# Patient Record
Sex: FEMALE | Race: WHITE
[De-identification: names, ages, dates, MRNs, and addresses within clinical notes are randomized per-mention and may not be internally consistent; named-entity substitution may affect disease eponyms.]

---

## 2018-09-20 ENCOUNTER — HOSPITAL ENCOUNTER (EMERGENCY)
Dept: HOSPITAL 41 - JD.ED | Age: 23
Discharge: HOME | End: 2018-09-20
Payer: COMMERCIAL
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
## 2018-09-20 DIAGNOSIS — F32.9: ICD-10-CM

## 2018-09-20 DIAGNOSIS — F41.9: ICD-10-CM
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
## 2018-09-20 DIAGNOSIS — S61.300A: Primary | ICD-10-CM

## 2018-09-20 PROCEDURE — 99283 EMERGENCY DEPT VISIT LOW MDM: CPT

## 2018-09-20 NOTE — EDM.PDOC
ED HPI GENERAL MEDICAL PROBLEM





- General


Chief Complaint: Laceration


Stated Complaint: RT POINTER FINGER LAC


Time Seen by Provider: 09/20/18 10:42


Source of Information: Reports: Patient


History Limitations: Reports: No Limitations





- History of Present Illness


INITIAL COMMENTS - FREE TEXT/NARRATIVE: 


23-year-old female presents to the ED with a work-related injury. She states 

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 

right index finger came in contact with the blade. Suffered an large avulsion 

injury to the distal aspect of her right index finger. Of note she is right 

hand dominant. Tetanus toxoid is up-to-date. No other injuries occurred.





Onset: Today


Onset Date: 09/20/18


Onset Time: 09:30


Duration: Minutes:


Location: Reports: Upper Extremity, Right (Right index finger)


Quality: Reports: Ache, Sharp, Stabbing, Throbbing


Severity: Moderate


Improves with: Reports: None (Another 10)


Worsens with: Reports: None


Context: Reports: Trauma (Work related injury when the radial aspect of her 

right index finger came in contact with a blade of a meat saw.).  Denies: 

Activity, Exercise, Lifting, Sick Contact


Associated Symptoms: Reports: No Other Symptoms


Treatments PTA: Reports: Other (see below)


  ** Right 2-Index finger


Pain Score (Numeric/FACES): 4





- Related Data


 Allergies











Allergy/AdvReac Type Severity Reaction Status Date / Time


 


No Known Allergies Allergy   Verified 09/20/18 09:53











Home Meds: 


 Home Meds





. [No Known Home Meds]  09/20/18 [History]











Past Medical History





- Past Health History


Medical/Surgical History: Denies Medical/Surgical History


OB/GYN History: Reports: Polycystic Ovaries


Psychiatric History: Reports: Anxiety, Depression





- Past Surgical History


Other Female  Surgeries/Procedures: One sided falopian tube removal





Social & Family History





- Family History


Family Medical History: Noncontributory





- Living Situation & Occupation


Living situation: Reports: Single


Occupation: Employed





ED ROS GENERAL





- Review of Systems


Review Of Systems: See Below


Constitutional: Reports: No Symptoms


HEENT: Reports: No Symptoms


Respiratory: Reports: No Symptoms


Cardiovascular: Reports: No Symptoms


Endocrine: Reports: No Symptoms


GI/Abdominal: Reports: No Symptoms


: Reports: No Symptoms


Musculoskeletal: Reports: No Symptoms


Skin: Reports: Other (Avulsion injury to the radial aspect of her right index 

finger middle phalanx)


Neurological: Reports: No Symptoms


Psychiatric: Reports: Anxiety (Currently under control with medical conditions.)

, Depression


Hematologic/Lymphatic: Reports: No Symptoms


Immunologic: Reports: No Symptoms





ED EXAM, SKIN/RASH


Exam: See Below


Exam Limited By: No Limitations


General Appearance: Alert, WD/WN, Moderate Distress


Eye Exam: Bilateral Eye: Normal Inspection


Extremities: Other (Examination was primarily to the right index finger. 

Patient is suffered an avulsion injury to the radial aspect of the middle 

phalanx of her index finger. This occurred when he came in contact with a meat 

slicer in the workplace this morning. Is a deep full thickness skin loss 

approximate 1.2 cm in length and 0.8 cm in width. Early nothing to suture. The 

fingernails intact. She still has sensation to the distal radial aspect of her 

finger. No neurovascular involvement appeared to occur and no tendon 

involvement.)


Neurological: Alert


Skin: Warm, Dry, Intact, Normal Color





Course





- Vital Signs


Last Recorded V/S: 


 Last Vital Signs











Temp  36.8 C   09/20/18 09:51


 


Pulse  101 H  09/20/18 09:51


 


Resp  17   09/20/18 09:51


 


BP  138/87   09/20/18 09:51


 


Pulse Ox  100   09/20/18 09:51














- Orders/Labs/Meds


Meds: 


Medications














Discontinued Medications














Generic Name Dose Route Start Last Admin





  Trade Name Jerrod  PRN Reason Stop Dose Admin


 


Ibuprofen  600 mg  09/20/18 11:30  





  Motrin  PO  09/20/18 11:31  





  ONETIME ONE   





     





     





     





     


 


Oxycodone/Acetaminophen  1 tab  09/20/18 11:29  





  Percocet 325-5 Mg  PO  09/20/18 11:30  





  ONETIME ONE   





     





     





     





     














- Radiology Interpretation


Free Text/Narrative:: 


23-year-old female presents to the ED with a work-related injury from this 

morning. She right index finger came in contact with a  in the 

workplace at about 9:30 this morning. This resulted in a avulsion injury to the 

middle phalanx of the radial aspect of her index finger. She has a loss of skin 

full-thickness approximate 1.2 cm in length and 0.8 cm in width. Treatment will 

have to be conservative and there is really nothing to suture. Neuro no 

neurovascular involvement has occurred. Wound has been soaked. Tetanus toxoid 

is up-to-date. Plan topical antibiotic Telfa pad and then tube gauze dressing 

for the next 3 days. After that she is to remove the dressing and cleanse the 

area daily with soap and water or with light soaks in some Epsom salts. Will 

antibiotic to the wound and apply a large bandage to keep clean. Note given to 

excuse her from the work place for the next 7-10 days until she will regain 

some of her  strength. She may return to work sooner if alternative work 

duties are available to her she will not have to use her right hand at all. 

Given Motrin 600 mg by mouth with Percocet 5/325 mg by mouth once.








Departure





- Departure


Time of Disposition: 11:08


Disposition: Home, Self-Care 01


Condition: Fair


Clinical Impression: 


Avulsion of skin of index finger


Qualifiers:


 Encounter type: initial encounter Qualified Code(s): S61.208A - Unspecified 

open wound of other finger without damage to nail, initial encounter








- Discharge Information


*PRESCRIPTION DRUG MONITORING PROGRAM REVIEWED*: Not Applicable


*COPY OF PRESCRIPTION DRUG MONITORING REPORT IN PATIENT ISREAL: Not Applicable


Instructions:  Deep Skin Avulsion


Referrals: 


PCP,None [Primary Care Provider] - 


Forms:  ED Department Discharge, ED Return to Work/School Form


Additional Instructions: 


Evaluation in the ED today in regards to work related injury. Deep avulsion 

injury has occurred along the medial-radial aspect of your right index finger. 

Is occurred when the finger came in contact with a  blade. The 

tissue has been completely avulsed and is full thickness skin loss 

approximately 1.2 cm x 8 mm. Treatment is conservative. Dressing placed in the 

ED is to remain in place for the next 3 days. After that she may remove the 

dressing and either soak the finger in warm solution with a little bit of Epson 

salts or shower with glancing water not hurting it. Then apply topical 

antibiotic such as bacitracin or Polysporin to the wound daily and cover with a 

bandage to keep clean. Wound will take between 4 and 5 weeks to completely 

heal. No skin will come from the  the edges of the wound and slowly 

closing in. Will be a permanent defect in the finger but mild. You may return 

to work as long as you're able to your to your work without use of the right 

index finger such as grabbing and  strength for a minimum of 10 days.Motrin 

600mg every 6hrs as needed for pain relief.  After that you may use as 

tolerated. Return to medical care if any signs of infection occur such as 

redness swelling or obvious pus.

## 2019-01-21 ENCOUNTER — HOSPITAL ENCOUNTER (EMERGENCY)
Dept: HOSPITAL 56 - MW.ED | Age: 24
Discharge: HOME | End: 2019-01-21
Payer: COMMERCIAL

## 2019-01-21 DIAGNOSIS — Z23: ICD-10-CM
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
## 2019-01-21 DIAGNOSIS — S61.210A: Primary | ICD-10-CM

## 2019-01-21 DIAGNOSIS — F17.210: ICD-10-CM

## 2019-01-21 NOTE — EDM.PDOC
ED HPI GENERAL MEDICAL PROBLEM





- General


Chief Complaint: Laceration


Stated Complaint: LACERATION ON FINGER


Time Seen by Provider: 01/21/19 16:33


Source of Information: Reports: Patient


History Limitations: Reports: No Limitations





- History of Present Illness


INITIAL COMMENTS - FREE TEXT/NARRATIVE: 


HISTORY AND PHYSICAL:





History of present illness:


Patient is a 24-year-old female who presents to the emergency room with 

complaints of a laceration to her right index finger. She states she was using 

a bread knife when it slipped resulting in this laceration. Her tetanus has 

been updated within the last 5 years.





Review of systems: 


As per history of present illness and below otherwise all systems reviewed and 

negative.





Past medical history: 


As per history of present illness and as reviewed below otherwise 

noncontributory.





Surgical history: 


As per history of present illness and as reviewed below otherwise 

noncontributory.





Social history: 


See social history for further information





Family history: 


As per history of present illness and as reviewed below otherwise 

noncontributory.





Physical exam:


General: Well-developed and well-nourished 24-year-old female. Alert and 

oriented. Nontoxic appearing and in no acute distress.


HEENT: Atraumatic, normocephalic, pupils equal and reactive bilaterally, 

negative for conjunctival pallor or scleral icterus, mucous membranes moist, 

TMs normal bilaterally, throat clear, neck supple, nontender, trachea midline. 

No drooling or trismus noted. No meningeal signs. No hot potato voice noted. 


Lungs: Clear to auscultation, breath sounds equal bilaterally, chest nontender.


Heart: S1S2, regular rate and rhythm without overt murmur


Abdomen: Soft, nondistended, nontender. 


Pelvis: Stable nontender.


Genitourinary: Deferred.


Rectal: Deferred.


Skin: 1 cm laceration to the palmar surface of the right distal index finger. 

This does not involve the nailbed. Otherwise skin is intact, warm, dry. No 

lesions or rashes noted.


Extremities: See skin for details. There is no tendon injury association with 

this laceration. negative for cords or calf pain. Neurovascular unremarkable.


Neuro: Awake, alert, oriented. Cranial nerves II through XII unremarkable. 

Cerebellum unremarkable. Motor and sensory unremarkable throughout. Exam 

nonfocal.





Notes:


Area was cleansed with chlorhexidine. 1% lidocaine was used to anesthetize the 

area. 5-0 chromic, #3 interrupted sutures placed using sterile technique. 

Patient tolerated well. Nonstick dressing applied. Supportive care measures 

were reviewed and discussed. She voices understanding and is agreeable to plan 

of care. Denies any further questions or concerns at this time.





Diagnostics:


None





Therapeutics:


Lidocaine, wound care, nonstick bulky dressing





Prescription:


None





Impression: 


Laceration





Plan:


1. Keep the area clean and dry. Continue to monitor for signs of infection. 

Sutures to be removed in 7-10 days.


2. Tylenol and/or ibuprofen as needed for pain management.


3. Please follow-up with your primary care provider in the next 1-2 days. 

Return to the ED as needed and as discussed.





Definitive disposition and diagnosis as appropriate pending reevaluation and 

review of above.








- Related Data


 Allergies











Allergy/AdvReac Type Severity Reaction Status Date / Time


 


No Known Allergies Allergy   Verified 01/21/19 16:43











Home Meds: 


 Home Meds





. [No Known Home Meds]  09/20/18 [History]











Past Medical History





- Past Health History


Medical/Surgical History: Denies Medical/Surgical History


OB/GYN History: Reports: Polycystic Ovaries


Psychiatric History: Reports: Anxiety, Depression





- Infectious Disease History


Infectious Disease History: Reports: None





- Past Surgical History


Other Female  Surgeries/Procedures: One sided falopian tube removal





Social & Family History





- Family History


Family Medical History: Noncontributory





- Tobacco Use


Smoking Status *Q: Current Some Day Smoker


Years of Tobacco use: 1


Packs/Tins Daily: 0.1





- Recreational Drug Use


Recreational Drug Use: No





- Living Situation & Occupation


Living situation: Reports: Single


Occupation: Employed





ED ROS GENERAL





- Review of Systems


Review Of Systems: ROS reveals no pertinent complaints other than HPI.





ED EXAM, SKIN/RASH


Exam: See Below (See dictation)





ED SKIN PROCEDURES





- Laceration/Wound Repair


  ** right index finger


Lac/Wound length In cm: 1


Appearance: Subcutaneous, Linear, Clean


Distal NVT: Neuro & Vascular Intact, No Tendon Injury


Anesthetic Type: Local


Local Anesthesia - Lidocaine (Xylocaine): 1% Plain


Local Anesthetic Volume: 1cc


Skin Prep: Chlorhexidine (Hibiciens), Saline


Saline Irrigation (cc's): 25


Exploration/Debridement/Repair: Wound Explored, No Foreign Material Found


Closed with: Sutures


Suture Size: other (5-0)


Suture Type: Interrupted, Simple


Repaired with: Chromic


Sterile Dressing Applied: Provider


Tetanus Status Addressed: Yes


Complications: No





Course





- Vital Signs


Last Recorded V/S: 


 Last Vital Signs











Temp  96.9 F   01/21/19 16:44


 


Pulse  75   01/21/19 17:14


 


Resp  18   01/21/19 17:14


 


BP  122/59 L  01/21/19 16:44


 


Pulse Ox  98   01/21/19 17:14














- Orders/Labs/Meds


Orders: 


 Active Orders 24 hr











 Category Date Time Status


 


 Vaccines to be Administered [RC] PER UNIT ROUTINE Care  01/21/19 16:36 Active











Meds: 


Medications














Discontinued Medications














Generic Name Dose Route Start Last Admin





  Trade Name Freq  PRN Reason Stop Dose Admin


 


Diphtheria/Tetanus/Acell Pertussis  0.5 ml  01/21/19 16:36  01/21/19 16:50





  Adacel  IM  01/21/19 16:37  Not Given





  .ONCE ONE   





     





     





     





     


 


Lidocaine HCl  5 ml  01/21/19 16:36  01/21/19 16:50





  Xylocaine-Mpf 1%  INJECT  01/21/19 16:37  5 ml





  ONETIME ONE   Administration





     





     





     





     


 


Lidocaine HCl  5 ml  01/21/19 16:49  01/21/19 16:50





  Xylocaine-Mpf 1%  INJECT  01/21/19 16:50  Not Given





  ONETIME ONE   





     





     





     





     














Departure





- Departure


Time of Disposition: 17:01


Disposition: Home, Self-Care 01


Clinical Impression: 


 Laceration








- Discharge Information


Instructions:  Laceration Care, Adult, Easy-to-Read


Referrals: 


PCP,None [Primary Care Provider] - 


Forms:  ED Department Discharge


Additional Instructions: 


The following information is given to patients seen in the emergency department 

who are being discharged to home. This information is to outline your options 

for follow-up care. We provide all patients seen in our emergency department 

with a follow-up referral.





The need for follow-up, as well as the timing and circumstances, are variable 

depending upon the specifics of your emergency department visit.





If you don't have a primary care physician on staff, we will provide you with a 

referral. We always advise you to contact your personal physician following an 

emergency department visit to inform them of the circumstance of the visit and 

for follow-up with them and/or the need for any referrals to a consulting 

specialist.





The emergency department will also refer you to a specialist when appropriate. 

This referral assures that you have the opportunity for follow-up care with a 

specialist. All of these measure are taken in an effort to provide you with 

optimal care, which includes your follow-up.





Under all circumstances we always encourage you to contact your private 

physician who remains a resource for coordinating your care. When calling for 

follow-up care, please make the office aware that this follow-up is from your 

recent emergency room visit. If for any reason you are refused follow-up, 

please contact the [facility] Emergency 

Department at [phone] and asked to speak to the emergency department 

charge nurse.





[facility]


Primary Care


[address] [place] [id number]


Phone: [phone]


Fax: [phone]





[facility]


[address] [place] [address]


Phone: [phone]


Fax: [phone]





1. Keep the area clean and dry. Continue to monitor for signs of infection. 

Sutures to be removed in 7-10 days.


2. Tylenol and/or ibuprofen as needed for pain management.


3. Please follow-up with your primary care provider in the next 1-2 days. 

Return to the ED as needed and as discussed.





- My Orders


Last 24 Hours: 


My Active Orders





01/21/19 16:36


Vaccines to be Administered [RC] PER UNIT ROUTINE 














- Assessment/Plan


Last 24 Hours: 


My Active Orders





01/21/19 16:36


Vaccines to be Administered [RC] PER UNIT ROUTINE

## 2019-01-30 ENCOUNTER — HOSPITAL ENCOUNTER (EMERGENCY)
Dept: HOSPITAL 56 - MW.ED | Age: 24
Discharge: LEFT BEFORE DISCHARGE | End: 2019-01-30
Payer: COMMERCIAL

## 2019-01-30 DIAGNOSIS — Z53.21: Primary | ICD-10-CM

## 2019-04-01 ENCOUNTER — HOSPITAL ENCOUNTER (EMERGENCY)
Dept: HOSPITAL 56 - MW.ED | Age: 24
Discharge: HOME | End: 2019-04-01
Payer: SELF-PAY

## 2019-04-01 DIAGNOSIS — N39.0: Primary | ICD-10-CM

## 2019-04-01 LAB
CHLORIDE SERPL-SCNC: 104 MMOL/L (ref 98–107)
SODIUM SERPL-SCNC: 141 MMOL/L (ref 136–145)

## 2019-04-01 PROCEDURE — 36415 COLL VENOUS BLD VENIPUNCTURE: CPT

## 2019-04-01 PROCEDURE — 85025 COMPLETE CBC W/AUTO DIFF WBC: CPT

## 2019-04-01 PROCEDURE — 87086 URINE CULTURE/COLONY COUNT: CPT

## 2019-04-01 PROCEDURE — 96374 THER/PROPH/DIAG INJ IV PUSH: CPT

## 2019-04-01 PROCEDURE — 81001 URINALYSIS AUTO W/SCOPE: CPT

## 2019-04-01 PROCEDURE — 87088 URINE BACTERIA CULTURE: CPT

## 2019-04-01 PROCEDURE — 81025 URINE PREGNANCY TEST: CPT

## 2019-04-01 PROCEDURE — 76705 ECHO EXAM OF ABDOMEN: CPT

## 2019-04-01 PROCEDURE — 99284 EMERGENCY DEPT VISIT MOD MDM: CPT

## 2019-04-01 PROCEDURE — 96375 TX/PRO/DX INJ NEW DRUG ADDON: CPT

## 2019-04-01 PROCEDURE — 96361 HYDRATE IV INFUSION ADD-ON: CPT

## 2019-04-01 PROCEDURE — 87186 SC STD MICRODIL/AGAR DIL: CPT

## 2019-04-01 PROCEDURE — 80053 COMPREHEN METABOLIC PANEL: CPT

## 2019-04-01 PROCEDURE — 76830 TRANSVAGINAL US NON-OB: CPT

## 2019-04-01 NOTE — US
EXAMINATION: Transabdominal and transvaginal pelvic ultrasound

 

HISTORY: Pain

 

COMPARISON: None

 

TECHNIQUE: Grayscale, color Doppler, and spectral Doppler imaging obtained.

 

FINDINGS: The uterus is normal in size, contour, echogenicity without a focal

mass. Endometrial stripe thickness is normal at 7 mm. No significant free pelvic

fluid.

 

Both the left and right ovaries are normal in size, contour, and echogenicity

demonstrating normal color and spectral Doppler flow. Small follicles

bilaterally. No adnexal masses.

 

IMPRESSION: 

1. Unremarkable pelvic ultrasound.

## 2019-04-01 NOTE — US
EXAMINATION: Limited transabdominal ultrasound

 

HISTORY: Pain

 

COMPARISON: None

 

TECHNIQUE: Grayscale and color Doppler imaging obtained of the right aspect of

the abdomen.

 

FINDINGS: The appendix has not characterize. No free fluid noted within the

right lower quadrant.

 

Visualized pancreas and liver appear normal. The hepatic echotexture is normal.

The right kidney measures at least 11.7 cm [id number] without evidence

hydronephrosis. The gallbladder wall thickness is normal. No pericholecystic

fluid or shadowing gallstones. The common bile duct measures 3 mm. Sonographic

Wolfe sign is negative.

 

IMPRESSION: 

1. No acute findings noted within the right aspect of the abdomen.

## 2019-04-01 NOTE — EDM.PDOC
ED HPI GENERAL MEDICAL PROBLEM





- General


Chief Complaint: Abdominal Pain


Stated Complaint: stomach pain


Time Seen by Provider: 04/01/19 10:22


Source of Information: Reports: Patient


History Limitations: Reports: No Limitations





- History of Present Illness


INITIAL COMMENTS - FREE TEXT/NARRATIVE: 


HISTORY AND PHYSICAL:





History of present illness:


Patient is a 24-year-old female who presents to the emergency room with 

complaints of right lower quadrant pain over the past 9-10 days. She states she 

has had some nausea over the past 24 hours, without vomiting. She does have a 

history of PCOS and has had fallopian tube removed (unsure of which side). She 

has had moderate amount of vaginal discharge noted. 





Patient denies any fever, chills, headache, change in vision, syncope or near 

syncope. Denies any chest pain, shortness of breath or cough. Denies any 

vomiting, diarrhea, constipation or dysuria. Has not noted any blood in urine 

or stool. Patient has been eating and drinking appropriately. Last menstrual 

period was approximately 2-3 weeks ago, states of systems normal. Denies any 

chance of pregnancy.





Review of systems: 


As per history of present illness and below otherwise all systems reviewed and 

negative.





Past medical history: 


As per history of present illness and as reviewed below otherwise 

noncontributory.





Surgical history: 


As per history of present illness and as reviewed below otherwise 

noncontributory.





Social history: 


See social history for further information





Family history: 


As per history of present illness and as reviewed below otherwise 

noncontributory.





Physical exam:


General: Well-developed and well-nourished 24-year-old female. Alert and 

oriented. Nontoxic appearing and in no acute distress.


HEENT: Atraumatic, normocephalic, pupils equal and reactive bilaterally, 

negative for conjunctival pallor or scleral icterus, mucous membranes moist, 

TMs normal bilaterally, throat clear, neck supple, nontender, trachea midline. 

No drooling or trismus noted. No meningeal signs. No hot potato voice noted. 


Lungs: Clear to auscultation, breath sounds equal bilaterally, chest nontender.


Heart: S1S2, regular rate and rhythm without overt murmur


Abdomen: Soft, nondistended, RLQ tenderness (no rebound tenderness). Negative 

for masses or hepatosplenomegaly. Negative for costovertebral tenderness.


Pelvis: Stable nontender.


Genitourinary: Declined


Rectal: Deferred.


Skin: Intact, warm, dry. No lesions or rashes noted.


Extremities: Atraumatic, moves all per self, negative for cords or calf pain. 

Neurovascular unremarkable.


Neuro: Awake, alert, oriented. Cranial nerves II through XII unremarkable. 

Cerebellum unremarkable. Motor and sensory unremarkable throughout. Exam 

nonfocal.





Notes:


Patient declined pelvic exam and STD screening/[place]. Lab work does show a 

UTI. Ultrasound shows no acute findings. Appendix is normal. Gallbladder is 

normal without any gallstones. Transvaginal pelvic ultrasound is within normal 

limits. We'll treat the UTI with Pyridium and Macrobid. Supportive care 

measures were reviewed and discussed. Voices understanding and is agreeable to 

plan of care. Denies any further questions or concerns at this time.





Diagnostics:


CBC, CMP, UA, HCGU, Abd/Pelvic Ultrasound





Therapeutics:


IV fluids, Zofran, Toradol





Prescription:


Macrobid, Pyridium





Impression: 


UTI


Right sided abdominal pain





Plan:


1. Increase your oral fluids. Take your medications as directed.


2. Tylenol and/or ibuprofen as needed for pain management.


3. Follow-up with your primary care provider as we discussed. Return to the ED 

as needed and as discussed.





Definitive disposition and diagnosis as appropriate pending reevaluation and 

review of above.





  ** RLQ


Pain Score (Numeric/FACES): 7





- Related Data


 Allergies











Allergy/AdvReac Type Severity Reaction Status Date / Time


 


No Known Allergies Allergy   Verified 04/01/19 10:40











Home Meds: 


 Home Meds





. [No Known Home Meds]  09/20/18 [History]











Past Medical History





- Past Health History


Medical/Surgical History: Denies Medical/Surgical History


OB/GYN History: Reports: Polycystic Ovaries


Psychiatric History: Reports: Anxiety, Depression





- Infectious Disease History


Infectious Disease History: Reports: None





- Past Surgical History


Other Female  Surgeries/Procedures: One sided falopian tube removal





Social & Family History





- Family History


Family Medical History: Noncontributory





- Living Situation & Occupation


Living situation: Reports: Single


Occupation: Employed





ED ROS GENERAL





- Review of Systems


Review Of Systems: ROS reveals no pertinent complaints other than HPI.





ED EXAM, GI/ABD





- Physical Exam


Exam: See Below (See dictation)





Course





- Vital Signs


Last Recorded V/S: 


 Last Vital Signs











Temp  97.9 F   04/01/19 11:18


 


Pulse  86   04/01/19 11:18


 


Resp  16   04/01/19 11:18


 


BP  123/74   04/01/19 11:18


 


Pulse Ox  98   04/01/19 11:18














- Orders/Labs/Meds


Orders: 


 Active Orders 24 hr











 Category Date Time Status


 


 CHLAMYDIA AND GONORRHEA BY TMA Stat Lab  04/01/19 11:11 Stop Req


 


 CULTURE URINE [RM] Stat Lab  04/01/19 11:11 Received


 


 TRICH/BASSEM/CAND BY DNA PROBE [[facility]C] Stat Lab  04/01/19 11:11 Stop Req











Labs: 


 Laboratory Tests











  04/01/19 04/01/19 04/01/19 Range/Units





  11:11 11:11 11:11 


 


WBC  8.56    (4.0-11.0)  K/uL


 


RBC  4.69    (4.30-5.90)  M/uL


 


Hgb  14.8    (12.0-16.0)  g/dL


 


Hct  41.3    (36.0-46.0)  %


 


MCV  88.1    (80.0-98.0)  fL


 


MCH  31.6    (27.0-32.0)  pg


 


MCHC  35.8    (31.0-37.0)  g/dL


 


RDW Std Deviation  37.8    (28.0-62.0)  fl


 


RDW Coeff of Christos  12    (11.0-15.0)  %


 


Plt Count  280    (150-400)  K/uL


 


MPV  9.20    (7.40-12.00)  fL


 


Neut % (Auto)  74.7    (48.0-80.0)  %


 


Lymph % (Auto)  19.5    (16.0-40.0)  %


 


Mono % (Auto)  4.8    (0.0-15.0)  %


 


Eos % (Auto)  0.9    (0.0-7.0)  %


 


Baso % (Auto)  0.1    (0.0-1.5)  %


 


Neut # (Auto)  6.4 H    (1.4-5.7)  K/uL


 


Lymph # (Auto)  1.7    (0.6-2.4)  K/uL


 


Mono # (Auto)  0.4    (0.0-0.8)  K/uL


 


Eos # (Auto)  0.1    (0.0-0.7)  K/uL


 


Baso # (Auto)  0.0    (0.0-0.1)  K/uL


 


Nucleated RBC %  0.0    /100WBC


 


Nucleated RBCs #  0    K/uL


 


Sodium    141  (136-145)  mmol/L


 


Potassium    3.2 L  (3.5-5.1)  mmol/L


 


Chloride    104  ()  mmol/L


 


Carbon Dioxide    20.9 L  (21.0-32.0)  mmol/L


 


BUN    14  (7.0-18.0)  mg/dL


 


Creatinine    0.6  (0.6-1.0)  mg/dL


 


Est Cr Clr Drug Dosing    121.66  mL/min


 


Estimated GFR (MDRD)    > 60.0  ml/min


 


Glucose    91  ()  mg/dL


 


Calcium    9.7  (8.5-10.1)  mg/dL


 


Total Bilirubin    0.5  (0.2-1.0)  mg/dL


 


AST    18  (15-37)  IU/L


 


ALT    24  (14-63)  IU/L


 


Alkaline Phosphatase    61  ()  U/L


 


Total Protein    8.9 H  (6.4-8.2)  g/dL


 


Albumin    4.6  (3.4-5.0)  g/dL


 


Globulin    4.3 H  (2.6-4.0)  g/dL


 


Albumin/Globulin Ratio    1.1  (0.9-1.6)  


 


Urine Color   YELLOW   


 


Urine Appearance   SLT CLOUDY   


 


Urine pH   5.5   (5.0-8.0)  


 


Ur Specific Gravity   1.015   (1.001-1.035)  


 


Urine Protein   30 H   (NEGATIVE)  mg/dL


 


Urine Glucose (UA)   NEGATIVE   (NEGATIVE)  mg/dL


 


Urine Ketones   TRACE H   (NEGATIVE)  mg/dL


 


Urine Occult Blood   MODERATE H   (NEGATIVE)  


 


Urine Nitrite   POSITIVE H   (NEGATIVE)  


 


Urine Bilirubin   NEGATIVE   (NEGATIVE)  


 


Urine Urobilinogen   0.2   (<2.0)  EU/dL


 


Ur Leukocyte Esterase   SMALL H   (NEGATIVE)  


 


Urine RBC   8-10   (0-2/HPF)  


 


Urine WBC   5-8   (0-5/HPF)  


 


Ur Epithelial Cells   OCCASIONAL   (NONE-FEW)  


 


Urine Bacteria   1+ H   (NEGATIVE)  


 


Urine HCG, Qual     (NEGATIVE)  














  04/01/19 Range/Units





  11:13 


 


WBC   (4.0-11.0)  K/uL


 


RBC   (4.30-5.90)  M/uL


 


Hgb   (12.0-16.0)  g/dL


 


Hct   (36.0-46.0)  %


 


MCV   (80.0-98.0)  fL


 


MCH   (27.0-32.0)  pg


 


MCHC   (31.0-37.0)  g/dL


 


RDW Std Deviation   (28.0-62.0)  fl


 


RDW Coeff of Christos   (11.0-15.0)  %


 


Plt Count   (150-400)  K/uL


 


MPV   (7.40-12.00)  fL


 


Neut % (Auto)   (48.0-80.0)  %


 


Lymph % (Auto)   (16.0-40.0)  %


 


Mono % (Auto)   (0.0-15.0)  %


 


Eos % (Auto)   (0.0-7.0)  %


 


Baso % (Auto)   (0.0-1.5)  %


 


Neut # (Auto)   (1.4-5.7)  K/uL


 


Lymph # (Auto)   (0.6-2.4)  K/uL


 


Mono # (Auto)   (0.0-0.8)  K/uL


 


Eos # (Auto)   (0.0-0.7)  K/uL


 


Baso # (Auto)   (0.0-0.1)  K/uL


 


Nucleated RBC %   /100WBC


 


Nucleated RBCs #   K/uL


 


Sodium   (136-145)  mmol/L


 


Potassium   (3.5-5.1)  mmol/L


 


Chloride   ()  mmol/L


 


Carbon Dioxide   (21.0-32.0)  mmol/L


 


BUN   (7.0-18.0)  mg/dL


 


Creatinine   (0.6-1.0)  mg/dL


 


Est Cr Clr Drug Dosing   mL/min


 


Estimated GFR (MDRD)   ml/min


 


Glucose   ()  mg/dL


 


Calcium   (8.5-10.1)  mg/dL


 


Total Bilirubin   (0.2-1.0)  mg/dL


 


AST   (15-37)  IU/L


 


ALT   (14-63)  IU/L


 


Alkaline Phosphatase   ()  U/L


 


Total Protein   (6.4-8.2)  g/dL


 


Albumin   (3.4-5.0)  g/dL


 


Globulin   (2.6-4.0)  g/dL


 


Albumin/Globulin Ratio   (0.9-1.6)  


 


Urine Color   


 


Urine Appearance   


 


Urine pH   (5.0-8.0)  


 


Ur Specific Gravity   (1.001-1.035)  


 


Urine Protein   (NEGATIVE)  mg/dL


 


Urine Glucose (UA)   (NEGATIVE)  mg/dL


 


Urine Ketones   (NEGATIVE)  mg/dL


 


Urine Occult Blood   (NEGATIVE)  


 


Urine Nitrite   (NEGATIVE)  


 


Urine Bilirubin   (NEGATIVE)  


 


Urine Urobilinogen   (<2.0)  EU/dL


 


Ur Leukocyte Esterase   (NEGATIVE)  


 


Urine RBC   (0-2/HPF)  


 


Urine WBC   (0-5/HPF)  


 


Ur Epithelial Cells   (NONE-FEW)  


 


Urine Bacteria   (NEGATIVE)  


 


Urine HCG, Qual  NEGATIVE  (NEGATIVE)  











Meds: 


Medications














Discontinued Medications














Generic Name Dose Route Start Last Admin





  Trade Name Freq  PRN Reason Stop Dose Admin


 


Sodium Chloride  1,000 mls @ 999 mls/hr  04/01/19 10:44  04/01/19 11:15





  Normal Saline  IV  04/01/19 11:44  999 mls/hr





  STAT ONE   Administration





     





     





     





     


 


Ketorolac Tromethamine  30 mg  04/01/19 10:46  04/01/19 11:15





  Toradol  IVPUSH  04/01/19 10:47  30 mg





  ONETIME ONE   Administration





     





     





     





     


 


Ondansetron HCl  4 mg  04/01/19 10:46  04/01/19 11:15





  Zofran  IVPUSH  04/01/19 10:47  4 mg





  ONETIME ONE   Administration





     





     





     





     














Departure





- Departure


Time of Disposition: 13:09


Disposition: Home, Self-Care 01


Clinical Impression: 


 Right lower quadrant abdominal pain





UTI (urinary tract infection)


Qualifiers:


 Urinary tract infection type: site unspecified Hematuria presence: with 

hematuria Qualified Code(s): N39.0 - Urinary tract infection, site not specified

; R31.9 - Hematuria, unspecified








- Discharge Information


Instructions:  Urinary Tract Infection, Adult, Easy-to-Read, Abdominal Pain, 

Adult


Referrals: 


PCP,None [Primary Care Provider] - 


Forms:  ED Department Discharge


Additional Instructions: 


The following information is given to patients seen in the emergency department 

who are being discharged to home. This information is to outline your options 

for follow-up care. We provide all patients seen in our emergency department 

with a follow-up referral.





The need for follow-up, as well as the timing and circumstances, are variable 

depending upon the specifics of your emergency department visit.





If you don't have a primary care physician on staff, we will provide you with a 

referral. We always advise you to contact your personal physician following an 

emergency department visit to inform them of the circumstance of the visit and 

for follow-up with them and/or the need for any referrals to a consulting 

specialist.





The emergency department will also refer you to a specialist when appropriate. 

This referral assures that you have the opportunity for follow-up care with a 

specialist. All of these measure are taken in an effort to provide you with 

optimal care, which includes your follow-up.





Under all circumstances we always encourage you to contact your private 

physician who remains a resource for coordinating your care. When calling for 

follow-up care, please make the office aware that this follow-up is from your 

recent emergency room visit. If for any reason you are refused follow-up, 

please contact the [facility] Emergency 

Department at [phone] and asked to speak to the emergency department 

charge nurse.





[facility]


Primary Care


[address] [place] [address]


Phone: [phone]


Fax: [phone]





[address] [address]


Phone: [phone]


Fax: [phone]





1. Increase your oral fluids. Take your medications as directed.


2. Tylenol and/or ibuprofen as needed for pain management.


3. Follow-up with your primary care provider as we discussed. Return to the ED 

as needed and as discussed.





- My Orders


Last 24 Hours: 


My Active Orders





04/01/19 11:11


CHLAMYDIA AND GONORRHEA BY TMA Stat 


CULTURE URINE [RM] Stat 


TRICH/BASSEM/CAND BY DNA PROBE [MOLEC] Stat 














- Assessment/Plan


Last 24 Hours: 


My Active Orders





04/01/19 11:11


CHLAMYDIA AND GONORRHEA BY TMA Stat 


CULTURE URINE [RM] Stat 


TRICH/BASSEM/CAND BY DNA PROBE [MOLEC] Stat

## 2020-12-31 ENCOUNTER — HOSPITAL ENCOUNTER (EMERGENCY)
Dept: HOSPITAL 56 - MW.ED | Age: 25
Discharge: HOME | End: 2020-12-31
Payer: COMMERCIAL
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
## 2020-12-31 DIAGNOSIS — S69.92XA: Primary | ICD-10-CM

## 2020-12-31 NOTE — EDM.PDOC
ED HPI GENERAL MEDICAL PROBLEM





- General


Chief Complaint: Upper Extremity Injury/Pain


Stated Complaint: POSSIBLE L/ BROKEN HAND


Time Seen by Provider: 12/31/20 10:06


Source of Information: Reports: Patient


History Limitations: Reports: No Limitations





- History of Present Illness


INITIAL COMMENTS - FREE TEXT/NARRATIVE: 


HISTORY AND PHYSICAL:





History of present illness:


Patient is a 25-year-old female who presents emergency room today with concern 

of left hand/wrist injury that occurred approximately 2 hours prior before 

coming to the emergency room.  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

shop and was using a lever to lift up a 60 pound heavy machinery.  Patient 

states the lever dropped and landed on her left wrist/hand.  Patient states that

she has been able to move her wrist but does have some pain with movement.  

Patient states that she was instructed by work to come and be evaluated due to 

the incident. Patient denies any other symptoms or concerns.





Patient denies fever, chills, chest pain, shortness of breath, or cough. Denies 

headache, neck stiff ness, change in vision, syncope, or near syncope. Denies 

nausea, vomiting, abdominal pain, diarrhea, constipation, or dysuria. Has not 

noted any blood in urine or stool. Patient has been eating and drinking 

appropriately.





Review of systems: 


As per history of present illness and below otherwise all systems reviewed and 

negative.





Past medical history: 


As per history of present illness and as reviewed below otherwise 

noncontributory.





Surgical history: 


As per history of present illness and as reviewed below otherwise 

noncontributory.





Social history: 


See social history for further information





Family history: 


As per history of present illness and as reviewed below otherwise noncontributor

y.





Physical exam:


General: Patient is alert, oriented, and in no acute distress. Patient sitting 

comfortably on exam table. Vitals stable and reviewed by me.


HEENT: Atraumatic, normocephalic, pupils equal and reactive bilaterally, 

negative for conjunctival pallor or scleral icterus, mucous membranes moist, TMs

normal bilaterally, throat clear, neck supple, nontender, trachea midline. No 

drooling or trismus noted. No meningeal signs. No hot potato voice noted. 


Lungs: Clear to auscultation, breath sounds equal bilaterally, chest nontender.


Heart: S1S2, regular rate and rhythm without overt murmur


Abdomen: Soft, nondistended, nontender. Negative for masses or 

hepatosplenomegaly. Negative for costovertebral tenderness.


Pelvis: Stable nontender.


Genitourinary: Deferred.


Rectal: Deferred.


Skin: Intact, warm, dry. No lesions or rashes noted.


Extremities: She has full range of motion of the complete left upper extremity 

without pain or difficulty.  Negative scaphoid tenderness.  Patient points to 

the distal ulnar area with stated pain but no pain to palpation of this area.  

Radial pulses grossly intact of the left upper extremity with capillary refill 

less than 2 seconds.  Otherwise, atraumatic, negative for cords or calf pain. 

Neurovascular unremarkable.


Neuro: Awake, alert, oriented. Cranial nerves II through XII unremarkable. 

Cerebellum unremarkable. Motor and sensory unremarkable throughout. Exam 

nonfocal.





Notes:


Signs and symptoms that would prompt return to the ED thoroughly discussed with 

patient.  Discussed importance for follow-up with a primary care provider.


Voices understanding and is agreeable to plan of care. Denies any further 

questions or concerns at this time.





Diagnostics:


Hand/wrist XR





Therapeutics:


ACE wrap





Prescription:


None





Impression: 


Left wrist injury





Plan:


1. Rest, ice, elevate the affected extremity. You can apply ice 15 minutes on, 

15 minutes off. 


2. Tylenol and/or Ibuprofen as directed for pain management or discomfort. 


3. Follow up with the primary care provider as discussed. Return to the ED as 

needed and as discussed.








Definitive disposition and diagnosis as appropriate pending reevaluation and 

review of above.





  ** left wrist


Pain Score (Numeric/FACES): 4





- Related Data


                                    Allergies











Allergy/AdvReac Type Severity Reaction Status Date / Time


 


No Known Allergies Allergy   Verified 12/31/20 10:09











Home Meds: 


                                    Home Meds





. [No Known Home Meds]  09/20/18 [History]











Past Medical History





- Past Health History


Medical/Surgical History: Denies Medical/Surgical History


OB/GYN History: Reports: Polycystic Ovaries


Other OB/GYN History: Removal of one fallopian tube.


Psychiatric History: Reports: Anxiety, Depression





- Infectious Disease History


Infectious Disease History: Reports: None





- Past Surgical History


Other Female  Surgeries/Procedures: One sided falopian tube removal





Social & Family History





- Family History


Family Medical History: No Pertinent Family History





- Caffeine Use


Caffeine Use: Reports: Energy Drinks





- Recreational Drug Use


Recreational Drug Use: No





- Living Situation & Occupation


Living situation: Reports: Single


Occupation: Employed





Review of Systems





- Review of Systems


Review Of Systems: Comprehensive ROS is negative, except as noted in HPI.





ED EXAM, GENERAL





- Physical Exam


Exam: See Below (see dictation)





Course





- Vital Signs


Last Recorded V/S: 


                                Last Vital Signs











Temp  96.8 F L  12/31/20 10:05


 


Pulse  92   12/31/20 10:05


 


Resp  16   12/31/20 10:05


 


BP  127/74   12/31/20 10:05


 


Pulse Ox  98   12/31/20 10:05














Departure





- Departure


Time of Disposition: 10:53


Disposition: Home, Self-Care 01


Clinical Impression: 


Left wrist injury


Qualifiers:


 Encounter type: initial encounter Qualified Code(s): S69.92XA - Unspecified 

injury of left wrist, hand and finger(s), initial encounter








- Discharge Information


Referrals: 


PCP,None [Primary Care Provider] - 


Forms:  ED Department Discharge


Additional Instructions: 


The following information is given to patients seen in the emergency department 

who are being discharged to home. This information is to outline your options 

for follow-up care. We provide all patients seen in our emergency department 

with a follow-up referral.





The need for follow-up, as well as the timing and circumstances, are variable 

depending upon the specifics of your emergency department visit.





If you don't have a primary care physician on staff, we will provide you with a 

referral. We always advise you to contact your personal physician following an 

emergency department visit to inform them of the circumstance of the visit and 

for follow-up with them and/or the need for any referrals to a consulting 

specialist.





The emergency department will also refer you to a specialist when appropriate. 

This referral assures that you have the opportunity for follow-up care with a 

specialist. All of these measure are taken in an effort to provide you with 

optimal care, which includes your follow-up.





Under all circumstances we always encourage you to contact your private 

physician who remains a resource for coordinating your care. When calling for 

follow-up care, please make the office aware that this follow-up is from your 

recent emergency room visit. If for any reason you are refused follow-up, please

contact the [facility] Emergency Department

at [phone] and asked to speak to the emergency department charge nurse.





[facility]


Primary Care


[address] [place] [address]


Phone: [phone]


Fax: [phone]





[facility]


[address] [place] [address]


Phone: [phone]


Fax: [phone]





1. Rest, ice, elevate the affected extremity. You can apply ice 15 minutes on, 

15 minutes off. 


2. Tylenol and/or Ibuprofen as directed for pain management or discomfort. 


3. Follow up with the primary care provider as discussed. Return to the ED as 

needed and as discussed.








 











Sepsis Event Note (ED)





- Evaluation


Sepsis Screening Result: No Definite Risk





- Focused Exam


Vital Signs: 


                                   Vital Signs











  Temp Pulse Resp BP Pulse Ox


 


 12/31/20 10:05  96.8 F L  92  16  127/74  98

## 2021-02-06 NOTE — CR
Indication:



Pain



Technique:



Examination consists of three views of the left hand. Two additional views 

were required to complete the left wrist examination.



Comparison:



There are no prior studies for comparison



Findings:



Bone mineral density appears normal. There is no lytic or blastic lesion, 

fracture or dislocation identified. No arthritic change. No gas within soft 

tissue, radiopaque foreign body or erosive change. No abnormal soft tissue 

calcification 



Impression:



Normal plain film examination of the left hand and of the left wrist



Dictated by Bj Multani MD @ Dec 31 2020 10:43AM



Signed by Dr. Bj Multani @ Dec 31 2020 10:46AM
Indication:



Pain



Technique:



Examination consists of three views of the left hand. Two additional views 

were required to complete the left wrist examination.



Comparison:



There are no prior studies for comparison



Findings:



Bone mineral density appears normal. There is no lytic or blastic lesion, 

fracture or dislocation identified. No arthritic change. No gas within soft 

tissue, radiopaque foreign body or erosive change. No abnormal soft tissue 

calcification 



Impression:



Normal plain film examination of the left hand and of the left wrist



Dictated by Bj Multani MD @ Dec 31 2020 10:43AM



Signed by Dr. Bj Multani @ Dec 31 2020 10:46AM
Attending Attestation (For Attendings USE Only)...

## 2023-07-17 ENCOUNTER — OFFICE VISIT (OUTPATIENT)
Dept: URGENT CARE | Facility: URGENT CARE | Age: 28
End: 2023-07-17

## 2023-07-17 VITALS
RESPIRATION RATE: 20 BRPM | TEMPERATURE: 97 F | OXYGEN SATURATION: 98 % | WEIGHT: 171.3 LBS | HEART RATE: 70 BPM | DIASTOLIC BLOOD PRESSURE: 86 MMHG | SYSTOLIC BLOOD PRESSURE: 130 MMHG

## 2023-07-17 DIAGNOSIS — J06.9 UPPER RESPIRATORY TRACT INFECTION, UNSPECIFIED TYPE: ICD-10-CM

## 2023-07-17 DIAGNOSIS — H65.191 OTHER NON-RECURRENT ACUTE NONSUPPURATIVE OTITIS MEDIA OF RIGHT EAR: Primary | ICD-10-CM

## 2023-07-17 DIAGNOSIS — H10.32 ACUTE CONJUNCTIVITIS OF LEFT EYE, UNSPECIFIED ACUTE CONJUNCTIVITIS TYPE: ICD-10-CM

## 2023-07-17 PROCEDURE — 99203 OFFICE O/P NEW LOW 30 MIN: CPT | Performed by: EMERGENCY MEDICINE

## 2023-07-17 RX ORDER — HYDROXYZINE HYDROCHLORIDE 25 MG/1
25-50 TABLET, FILM COATED ORAL AT BEDTIME
COMMUNITY
Start: 2023-07-07

## 2023-07-17 RX ORDER — LAMOTRIGINE 150 MG/1
1 TABLET ORAL
COMMUNITY
Start: 2023-07-07

## 2023-07-17 RX ORDER — ERYTHROMYCIN 5 MG/G
0.5 OINTMENT OPHTHALMIC 4 TIMES DAILY
Qty: 3.5 G | Refills: 0 | Status: SHIPPED | OUTPATIENT
Start: 2023-07-17 | End: 2023-07-22

## 2023-07-17 RX ORDER — CITALOPRAM HYDROBROMIDE 20 MG/1
20 TABLET ORAL
COMMUNITY
Start: 2023-07-07

## 2023-07-17 NOTE — PROGRESS NOTES
Assessment & Plan     Diagnosis:    ICD-10-CM    1. Other non-recurrent acute nonsuppurative otitis media of right ear  H65.191 amoxicillin-clavulanate (AUGMENTIN) 875-125 MG tablet      2. Upper respiratory tract infection, unspecified type  J06.9       3. Acute conjunctivitis of left eye, unspecified acute conjunctivitis type  H10.32 erythromycin (ROMYCIN) 5 MG/GM ophthalmic ointment          Medical Decision Making:  Kelli Barry is a 28 year old female presents to clinic for concern for ear infection. Associated symptoms include . The patient has an exam consistent with otitis media and acute conjuncitivits; likely bacterial based on appearance. There is no sign of mastoiditis, dental abscess, or peritonsillar abscess. The patient will be started on antibiotics and may take dose appropriate Tylenol or ibuprofen for pain.  Return if increasing pain, worsening fever, hearing decrease or discharge.  Follow-up with PCP or ENT in 7-10 days. Patient voices understanding and agreement with the plan including reasons to go to the ER.    Navjot Garcia PA-C  [facility]    Subjective     Kelli Barry is a 28 year old female who presents to clinic today for the following health issues:  Chief Complaint   Patient presents with     Sinus Problem     Cough     Conjunctivitis     Otalgia     Right      Headache     Fatigue       HPI    Onset of symptoms was 1 week ago.  Course of illness is worsening.    Severity moderate  Current and Associated symptoms: chills, cough - non-productive, ear pain right, sore throat, facial pain/pressure, headache, body aches, fatigue, nausea and not sleeping well  Denies wheezing, shortness of breath, hoarse voice, vomiting, diarrhea and not eating  Treatment measures tried include Tylenol/Ibuprofen  Predisposing factors include ill contact: Family member (daughter with similar symptoms)    No drainage from the ear, difficulties breathing or  swallowing.      Review of Systems    See HPI    Objective      Vitals: /86   Pulse 70   Temp 97  F (36.1  C) (Tympanic)   Resp 20   Wt 77.7 kg (171 lb 4.8 oz)   SpO2 98%       Patient Vitals for the past 24 hrs:   BP Temp Temp src Pulse Resp SpO2 Weight   07/17/23 1633 130/86 97  F (36.1  C) Tympanic 70 20 98 % 77.7 kg (171 lb 4.8 oz)       Vital signs reviewed by: Navjot Garcia PA-C    Physical Exam   Constitutional: Alert and active. No acute distress.  HENT: Ears: Right TM is erythematous and bulging. Left TM is normal No perforation. Bilateral external ear canals and auricles are normal. No tenderness with manipulation of the pinnae and tragus. No mastoid tenderness bilaterally.  Nose: Nose normal.    Mouth: Normal tongue and tonsil. Posterior oropharynx is clear. Uvula is midline.  Cardiovascular: Regular rate and rhythm  Pulmonary/Chest: Effort normal. No respiratory distress. Lungs clear to auscultation bilaterally.  Skin: No rash noted on visualized skin or face.        Navjot Garcia PA-C, July 17, 2023